# Patient Record
Sex: MALE | Race: WHITE | Employment: UNEMPLOYED | URBAN - METROPOLITAN AREA
[De-identification: names, ages, dates, MRNs, and addresses within clinical notes are randomized per-mention and may not be internally consistent; named-entity substitution may affect disease eponyms.]

---
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
## 2023-01-25 ENCOUNTER — E-VISIT (OUTPATIENT)
Dept: PRIMARY CARE CLINIC | Age: 18
End: 2023-01-25
Payer: COMMERCIAL

## 2023-01-25 DIAGNOSIS — J01.90 ACUTE NON-RECURRENT SINUSITIS, UNSPECIFIED LOCATION: Primary | ICD-10-CM

## 2023-01-25 PROCEDURE — 99422 OL DIG E/M SVC 11-20 MIN: CPT | Performed by: NURSE PRACTITIONER

## 2023-01-25 RX ORDER — AZITHROMYCIN 250 MG/1
250 TABLET, FILM COATED ORAL SEE ADMIN INSTRUCTIONS
Qty: 6 TABLET | Refills: 0 | Status: SHIPPED | OUTPATIENT
Start: 2023-01-25 | End: 2023-01-30

## 2023-01-25 ASSESSMENT — LIFESTYLE VARIABLES: SMOKING_STATUS: NO, I'VE NEVER SMOKED

## 2023-01-25 NOTE — PROGRESS NOTES
Wally Balderrama (2005) initiated an asynchronous digital communication through [employer]. HPI: per patient questionnaire     Exam: not applicable    Diagnoses and all orders for this visit:  Diagnoses and all orders for this visit:    Acute non-recurrent sinusitis, unspecified location  -     azithromycin (ZITHROMAX) 250 MG tablet; Take 1 tablet by mouth See Admin Instructions for 5 days 500mg on day 1 followed by 250mg on days 2 - 5    Supportive care measures provided  Recommend f/u with pcp as needed    Time: EV2 - 11-20 minutes were spent on the digital evaluation and management of this patient.  16 min     AUSTIN Ma - CNP

## 2023-11-09 ENCOUNTER — NURSE TRIAGE (OUTPATIENT)
Dept: OTHER | Facility: CLINIC | Age: 18
End: 2023-11-09

## 2023-11-09 ENCOUNTER — TELEPHONE (OUTPATIENT)
Dept: FAMILY MEDICINE CLINIC | Age: 18
End: 2023-11-09

## 2023-11-09 NOTE — TELEPHONE ENCOUNTER
Location of patient: [address] call from Mena Arredondoin at [facility] with [employer]. Subjective: Caller states \"he just didn't feel right. He says hes been dizzy everyday and his chest hurts. He says he gets sharp pains throughout the day\"     Current Symptoms: Chest tightness, comes and goes, last for 1 hour, middle of chest. Dizzy for 2 weeks-states room is spinning-worsens throughout the day, dec sleeping. States room is not spinning now, feels more difficult to focus- for a couple minutes. Numbness and tingling to arms. Sore throat for 1 week. States he feels he can't get his words out and that he is having trouble speaking. HR 72    Denies HA, redness or white patches to throat. Nausea or vomiting. Negative covid test    Onset: 2 weeks ago;     Associated Symptoms: increased wakefulness    Pain Severity: 5/10; tightness; intermittent    Temperature: denies fevers     What has been tried:     LMP: NA Pregnant: NA    Recommended disposition: See in Office Today    Care advice provided, patient verbalizes understanding; denies any other questions or concerns; instructed to call back for any new or worsening symptoms. Patient/Caller agrees with recommended disposition; writer provided warm transfer to [facility] at [facility] for appointment scheduling    Attention Provider: Thank you for allowing me to participate in the care of your patient. The patient was connected to triage in response to information provided to the E[facility]/PSC. Please do not respond through this encounter as the response is not directed to a shared pool.       Reason for Disposition   MODERATE dizziness (e.g., interferes with normal activities)  (Exception: Dizziness caused by heat exposure, sudden standing, or poor fluid intake.)    Protocols used: Dizziness-ADULT-[place]

## 2023-11-09 NOTE — TELEPHONE ENCOUNTER
Patient reports dizziness (intermittent) chest tightness (intermittent) and sore throat x 2 weeks. Advised to go to ER. Patient refused. Will do COVID test and call back with results.     Scheduled with Eliezer wheatley, 11/110/23

## 2023-11-09 NOTE — TELEPHONE ENCOUNTER
Tell her that I am comfortable with any of the providers for an acute visit. People are sometimes positive for 1-2 weeks.  Usually we treat depending on the symptoms

## 2023-11-09 NOTE — TELEPHONE ENCOUNTER
Patient's mom, Liv (on hippa), called to report that the patient is positive for covid. She denied scheduling a virtual appointment with Dr. Gerald Morris for tomorrow. She would like to schedule a follow up appointment for this with another provider in the practice, but she wants to get Dr. Barrera Emerson advice on who he would recommend her son see in his absence. Additionally, she states her son has has symptoms for 2 weeks, and she is wondering how long people typically test positive for covid.

## 2024-10-18 ENCOUNTER — OFFICE VISIT (OUTPATIENT)
Dept: FAMILY MEDICINE CLINIC | Age: 19
End: 2024-10-18
Payer: COMMERCIAL

## 2024-10-18 VITALS
HEIGHT: 71 IN | OXYGEN SATURATION: 98 % | BODY MASS INDEX: 31.92 KG/M2 | WEIGHT: 228 LBS | DIASTOLIC BLOOD PRESSURE: 84 MMHG | TEMPERATURE: 98.9 F | SYSTOLIC BLOOD PRESSURE: 122 MMHG | HEART RATE: 77 BPM

## 2024-10-18 DIAGNOSIS — R10.33 PERIUMBILICAL ABDOMINAL PAIN: ICD-10-CM

## 2024-10-18 DIAGNOSIS — R10.9 FLANK PAIN: Primary | ICD-10-CM

## 2024-10-18 LAB
BILIRUBIN, POC: NORMAL
BLOOD URINE, POC: NORMAL
CLARITY, POC: CLEAR
COLOR, POC: YELLOW
GLUCOSE URINE, POC: NORMAL MG/DL
KETONES, POC: NORMAL MG/DL
LEUKOCYTE EST, POC: NORMAL
NITRITE, POC: NORMAL
PH, POC: 5.5
PROTEIN, POC: NORMAL MG/DL
SPECIFIC GRAVITY, POC: 1.25
UROBILINOGEN, POC: 0.2 MG/DL

## 2024-10-18 PROCEDURE — 99213 OFFICE O/P EST LOW 20 MIN: CPT | Performed by: SURGERY

## 2024-10-18 PROCEDURE — 81002 URINALYSIS NONAUTO W/O SCOPE: CPT | Performed by: SURGERY

## 2024-10-18 SDOH — ECONOMIC STABILITY: INCOME INSECURITY: HOW HARD IS IT FOR YOU TO PAY FOR THE VERY BASICS LIKE FOOD, HOUSING, MEDICAL CARE, AND HEATING?: NOT HARD AT ALL

## 2024-10-18 SDOH — ECONOMIC STABILITY: FOOD INSECURITY: WITHIN THE PAST 12 MONTHS, THE FOOD YOU BOUGHT JUST DIDN'T LAST AND YOU DIDN'T HAVE MONEY TO GET MORE.: NEVER TRUE

## 2024-10-18 SDOH — ECONOMIC STABILITY: FOOD INSECURITY: WITHIN THE PAST 12 MONTHS, YOU WORRIED THAT YOUR FOOD WOULD RUN OUT BEFORE YOU GOT MONEY TO BUY MORE.: NEVER TRUE

## 2024-10-18 ASSESSMENT — PATIENT HEALTH QUESTIONNAIRE - PHQ9
SUM OF ALL RESPONSES TO PHQ9 QUESTIONS 1 & 2: 0
SUM OF ALL RESPONSES TO PHQ QUESTIONS 1-9: 0
2. FEELING DOWN, DEPRESSED OR HOPELESS: NOT AT ALL
SUM OF ALL RESPONSES TO PHQ QUESTIONS 1-9: 0
SUM OF ALL RESPONSES TO PHQ QUESTIONS 1-9: 0
1. LITTLE INTEREST OR PLEASURE IN DOING THINGS: NOT AT ALL
SUM OF ALL RESPONSES TO PHQ QUESTIONS 1-9: 0

## 2024-10-18 ASSESSMENT — ENCOUNTER SYMPTOMS
CONSTIPATION: 0
DIARRHEA: 0
FLATUS: 0
NAUSEA: 0
ABDOMINAL PAIN: 1
VOMITING: 0

## 2024-10-18 NOTE — PROGRESS NOTES
10/18/2024    This is a 19 y.o. male   Chief Complaint   Patient presents with    Abdominal Pain     Stomach pain x 1 week, started as front and both sides, sharp pain in center this a.m. no N/V/D, no fever. Sometimes hurts more after he eats, no specific food   .    Abdominal Pain  This is a new problem. The current episode started in the past 7 days. The onset quality is gradual. The problem occurs intermittently. The problem has been waxing and waning since onset. The pain is located in the periumbilical region. The pain is at a severity of 4/10 (can spike to 6/10). The quality of the pain is described as sharp and aching. The pain does not radiate. Pertinent negatives include no anorexia, constipation, diarrhea, flatus, frequency, headaches, melena, nausea or vomiting.        Patient Active Problem List   Diagnosis    Renal calculi       Current Outpatient Medications   Medication Sig Dispense Refill    tamsulosin (FLOMAX) 0.4 MG capsule Take 1 capsule by mouth daily For urine flow (Patient not taking: Reported on 10/18/2024) 15 capsule 1    phenazopyridine (PYRIDIUM) 200 MG tablet Take 1 tablet by mouth 3 times daily as needed for Pain (urinaery pain / spasm) (Patient not taking: Reported on 10/18/2024) 9 tablet 0     No current facility-administered medications for this visit.       No Known Allergies    Social History     Tobacco Use    Smoking status: Never    Smokeless tobacco: Never   Substance Use Topics    Alcohol use: No       Review of Systems   Gastrointestinal:  Positive for abdominal pain. Negative for anorexia, constipation, diarrhea, flatus, melena, nausea and vomiting.   Genitourinary:  Negative for frequency.   Neurological:  Negative for headaches.       /84   Pulse 77   Temp 98.9 °F (37.2 °C)   Ht 1.803 m (5' 11\")   Wt 103.4 kg (228 lb)   SpO2 98%   BMI 31.80 kg/m²     Physical Exam  Abdominal:      General: Bowel sounds are normal. There is no distension.      Palpations: There is

## 2024-11-04 ENCOUNTER — TELEPHONE (OUTPATIENT)
Dept: FAMILY MEDICINE CLINIC | Age: 19
End: 2024-11-04